# Patient Record
Sex: MALE | Race: WHITE | URBAN - METROPOLITAN AREA
[De-identification: names, ages, dates, MRNs, and addresses within clinical notes are randomized per-mention and may not be internally consistent; named-entity substitution may affect disease eponyms.]

---

## 2017-04-14 ENCOUNTER — HOSPITAL ENCOUNTER (OUTPATIENT)
Dept: GENERAL RADIOLOGY | Facility: HOSPITAL | Age: 42
Discharge: HOME | End: 2017-04-14
Attending: ORTHOPAEDIC SURGERY
Payer: COMMERCIAL

## 2017-04-14 ENCOUNTER — OFFICE VISIT (OUTPATIENT)
Dept: ORTHOPEDICS CLINIC | Facility: CLINIC | Age: 42
End: 2017-04-14

## 2017-04-14 DIAGNOSIS — R07.89 LEFT-SIDED CHEST WALL PAIN: Primary | ICD-10-CM

## 2017-04-14 DIAGNOSIS — T14.90XA INJURY: ICD-10-CM

## 2017-04-14 PROCEDURE — 71020 XR CHEST PA + LAT CHEST (CPT=71020): CPT

## 2017-04-14 PROCEDURE — 99203 OFFICE O/P NEW LOW 30 MIN: CPT | Performed by: ORTHOPAEDIC SURGERY

## 2017-04-14 PROCEDURE — 99212 OFFICE O/P EST SF 10 MIN: CPT | Performed by: ORTHOPAEDIC SURGERY

## 2017-04-14 NOTE — PROGRESS NOTES
4/14/2017  Heavenly Gaspar  8/19/1975  46year old   male  Key Dumont MD    HPI:   Patient presents with:  Consult: left rib injury- pt states he fell on ice while at work on 1/13/17.  pt would like to RTW. pt went to [facility] and had XR and bro patient has no tenderness to palpation over the anterior, lateral, or posterior chest wall. IMAGING AND TESTING:  Chest x-ray was obtained today and reveals no fracture or dislocation.     ASSESSMENT AND PLAN:   Left-sided chest wall pain  (primary encou

## (undated) NOTE — MR AVS SNAPSHOT
[facility]  [address]  [phone]               Thank you for choosing us for your health care visit with Eileen Iglesias MD.  We are glad to serve you and happy to provide you with this dorado Medical Issues Discussed Today     Left-sided chest wall pain    -  Primary    Injury          Instructions and Information about Your Health     None      Allergies as of Apr 14, 2017     No Known Allergies                   Current Medications      Notic Tips for increasing your physical activity – Adults who are physically active are less likely to develop some chronic diseases than adults who are inactive.      HOW TO GET STARTED: HOW TO STAY MOTIVATED:   Start activities slowly and build up over time Do

## (undated) NOTE — Clinical Note
4/14/2017          To Whom It May Concern:    Anu Melendez is currently under my medical care. He may return to work on 4/24/17 with no restrictions. If you require additional information please contact our office.         Sincerely,    Shelby Somers